# Patient Record
Sex: MALE | Race: WHITE | NOT HISPANIC OR LATINO | URBAN - METROPOLITAN AREA
[De-identification: names, ages, dates, MRNs, and addresses within clinical notes are randomized per-mention and may not be internally consistent; named-entity substitution may affect disease eponyms.]

---

## 2021-04-07 ENCOUNTER — EMERGENCY (EMERGENCY)
Facility: HOSPITAL | Age: 21
LOS: 1 days | Discharge: HOME | End: 2021-04-07
Attending: EMERGENCY MEDICINE | Admitting: EMERGENCY MEDICINE
Payer: OTHER GOVERNMENT

## 2021-04-07 VITALS
RESPIRATION RATE: 16 BRPM | OXYGEN SATURATION: 100 % | DIASTOLIC BLOOD PRESSURE: 85 MMHG | SYSTOLIC BLOOD PRESSURE: 135 MMHG | HEART RATE: 83 BPM

## 2021-04-07 VITALS
WEIGHT: 205.03 LBS | HEART RATE: 89 BPM | SYSTOLIC BLOOD PRESSURE: 153 MMHG | DIASTOLIC BLOOD PRESSURE: 83 MMHG | RESPIRATION RATE: 18 BRPM | TEMPERATURE: 98 F | OXYGEN SATURATION: 94 %

## 2021-04-07 PROCEDURE — 71046 X-RAY EXAM CHEST 2 VIEWS: CPT

## 2021-04-07 PROCEDURE — 93005 ELECTROCARDIOGRAM TRACING: CPT | Mod: 76

## 2021-04-07 PROCEDURE — 99284 EMERGENCY DEPT VISIT MOD MDM: CPT

## 2021-04-07 PROCEDURE — 71046 X-RAY EXAM CHEST 2 VIEWS: CPT | Mod: 26

## 2021-04-07 PROCEDURE — 93010 ELECTROCARDIOGRAM REPORT: CPT | Mod: 76

## 2021-04-07 PROCEDURE — 99283 EMERGENCY DEPT VISIT LOW MDM: CPT | Mod: 25

## 2021-04-07 RX ORDER — IBUPROFEN 200 MG
600 TABLET ORAL ONCE
Refills: 0 | Status: COMPLETED | OUTPATIENT
Start: 2021-04-07 | End: 2021-04-07

## 2021-04-07 RX ADMIN — Medication 600 MILLIGRAM(S): at 12:16

## 2021-04-07 NOTE — ED PROVIDER NOTE - PROGRESS NOTE DETAILS
Xr neg. Remains well. Exam benign. VS wnl. F/u outpatient. Motrin, supportive care. Return precautions discussed

## 2021-04-07 NOTE — ED PROVIDER NOTE - NSFOLLOWUPINSTRUCTIONS_ED_ALL_ED_FT
Thank you for visiting our Emergency Department, it has been a pleasure taking part in your healthcare.    Please follow up with your Primary Doctor in 2-3 days.      Noncardiac Chest Pain  WHAT YOU NEED TO KNOW:  Noncardiac chest pain is pain or discomfort in your chest not caused by a heart problem. It may be caused by acid reflux, nerve or muscle problems within your esophagus, or chest wall, muscle, or rib pain. It may also be caused by panic attacks, anxiety, or depression.    DISCHARGE INSTRUCTIONS:  Return to the emergency department if:     You have severe chest pain.    Contact your healthcare provider if:   Your chest pain does not get better, even with treatment.    You have questions or concerns about your condition or care.    Medicines:   Medicines may be given to treat the cause of your chest pain. You may be given medicines to decrease pain, relieve anxiety, decrease acid reflux, or relax muscles in your esophagus.     Take your medicine as directed. Contact your healthcare provider if you think your medicine is not helping or if you have side effects. Tell him of her if you are allergic to any medicine. Keep a list of the medicines, vitamins, and herbs you take. Include the amounts, and when and why you take them. Bring the list or the pill bottles to follow-up visits. Carry your medicine list with you in case of an emergency.    Cognitive therapy: Cognitive therapy may be helpful if you have panic attacks, anxiety, or depression. It can help you change how you react to situations that tend to trigger your chest pain.     Follow up with your healthcare provider as directed: Write down your questions so you remember to ask them during your visits

## 2021-04-07 NOTE — ED PROVIDER NOTE - CARDIAC, MLM
mildly tachycardic during exam, regular rhythm.  Heart sounds S1, S2.  No murmurs, rubs or gallops. Mildly tachycardic during exam, regular rhythm.  Heart sounds S1, S2.  No murmurs, rubs or gallops.

## 2021-04-07 NOTE — ED PROVIDER NOTE - CLINICAL SUMMARY MEDICAL DECISION MAKING FREE TEXT BOX
Litzy Redman MD - Attending Physician: Pt here with atypical chest pain, sharp, worse with breaths, recent illness, now improved. Exam benign. No risk ACS/PE factors. Likely MSK chest wall pain. Xr, EKG for likely dc home

## 2021-04-07 NOTE — ED ADULT NURSE NOTE - OBJECTIVE STATEMENT
pt to room 50 c/o midsternal cp that came this am pain worse with deep breathing pain not reproducible pt awake alert color good skin warm dry lung s cl CM ST @ 106 .  abd soft nt pt seen and examined med as orderd,

## 2021-04-07 NOTE — ED PROVIDER NOTE - OBJECTIVE STATEMENT
19yo M with no PMH presenting with pleuritic CP since waking up this morning. Pt reports sharp, midsternal CP with every breath, worse with deep breaths. Felt well the last few days but does report being sick last week. Reports cough and headaches, no fevers, and COVID negative at that time. Has not taken anything for pain. Position does not seem to change pain. Denies any current cough, SOB, palpitations, abd pain, n/v/d, LE pain/swelling, smoking/vaping history, recent travel, family or personal history of PE/DVT.

## 2021-04-07 NOTE — ED PROVIDER NOTE - PATIENT PORTAL LINK FT
You can access the FollowMyHealth Patient Portal offered by [facility] by registering at the following website: [email or web address]. By joining [facility]’s FollowMyHealth portal, you will also be able to view your health information using other applications (apps) compatible with our system.